# Patient Record
Sex: MALE | Race: OTHER | HISPANIC OR LATINO | Employment: UNEMPLOYED | URBAN - METROPOLITAN AREA
[De-identification: names, ages, dates, MRNs, and addresses within clinical notes are randomized per-mention and may not be internally consistent; named-entity substitution may affect disease eponyms.]

---

## 2019-10-28 ENCOUNTER — TRANSCRIBE ORDERS (OUTPATIENT)
Dept: RADIOLOGY | Facility: HOSPITAL | Age: 59
End: 2019-10-28

## 2019-10-28 ENCOUNTER — APPOINTMENT (OUTPATIENT)
Dept: LAB | Facility: CLINIC | Age: 59
End: 2019-10-28
Payer: COMMERCIAL

## 2019-10-28 ENCOUNTER — OFFICE VISIT (OUTPATIENT)
Dept: FAMILY MEDICINE CLINIC | Facility: CLINIC | Age: 59
End: 2019-10-28
Payer: COMMERCIAL

## 2019-10-28 ENCOUNTER — HOSPITAL ENCOUNTER (OUTPATIENT)
Dept: RADIOLOGY | Facility: HOSPITAL | Age: 59
Discharge: HOME | End: 2019-10-28
Payer: COMMERCIAL

## 2019-10-28 VITALS
DIASTOLIC BLOOD PRESSURE: 66 MMHG | HEIGHT: 63 IN | RESPIRATION RATE: 20 BRPM | SYSTOLIC BLOOD PRESSURE: 122 MMHG | WEIGHT: 193.2 LBS | BODY MASS INDEX: 34.23 KG/M2 | HEART RATE: 80 BPM

## 2019-10-28 DIAGNOSIS — M54.16 LUMBAR RADICULITIS: ICD-10-CM

## 2019-10-28 DIAGNOSIS — Z12.5 SCREENING FOR PROSTATE CANCER: ICD-10-CM

## 2019-10-28 DIAGNOSIS — R09.89 DECREASED PEDAL PULSES: ICD-10-CM

## 2019-10-28 DIAGNOSIS — Z00.00 HEALTH CARE MAINTENANCE: ICD-10-CM

## 2019-10-28 DIAGNOSIS — Z12.11 SCREENING FOR COLON CANCER: ICD-10-CM

## 2019-10-28 DIAGNOSIS — M79.606 PAIN OF LOWER EXTREMITY, UNSPECIFIED LATERALITY: ICD-10-CM

## 2019-10-28 DIAGNOSIS — Z13.220 SCREENING FOR LIPID DISORDERS: ICD-10-CM

## 2019-10-28 DIAGNOSIS — E66.9 OBESITY (BMI 30-39.9): ICD-10-CM

## 2019-10-28 DIAGNOSIS — M54.16 LUMBAR RADICULITIS: Primary | ICD-10-CM

## 2019-10-28 DIAGNOSIS — M62.830 MUSCLE SPASM OF BACK: ICD-10-CM

## 2019-10-28 LAB
ALBUMIN SERPL BCP-MCNC: 4.2 G/DL (ref 3.5–5)
ALP SERPL-CCNC: 79 U/L (ref 46–116)
ALT SERPL W P-5'-P-CCNC: 30 U/L (ref 12–78)
ANION GAP SERPL CALCULATED.3IONS-SCNC: 7 MMOL/L (ref 4–13)
AST SERPL W P-5'-P-CCNC: 16 U/L (ref 5–45)
BILIRUB SERPL-MCNC: 0.49 MG/DL (ref 0.2–1)
BILIRUB UR QL STRIP: NEGATIVE
BUN SERPL-MCNC: 18 MG/DL (ref 5–25)
CALCIUM SERPL-MCNC: 9 MG/DL (ref 8.3–10.1)
CHLORIDE SERPL-SCNC: 109 MMOL/L (ref 100–108)
CHOLEST SERPL-MCNC: 178 MG/DL (ref 50–200)
CLARITY UR: CLEAR
CO2 SERPL-SCNC: 26 MMOL/L (ref 21–32)
COLOR UR: YELLOW
CREAT SERPL-MCNC: 0.91 MG/DL (ref 0.6–1.3)
ERYTHROCYTE [DISTWIDTH] IN BLOOD BY AUTOMATED COUNT: 12.4 % (ref 11.6–15.1)
GFR SERPL CREATININE-BSD FRML MDRD: 91 ML/MIN/1.73SQ M
GLUCOSE P FAST SERPL-MCNC: 100 MG/DL (ref 65–99)
GLUCOSE UR STRIP-MCNC: NEGATIVE MG/DL
HCT VFR BLD AUTO: 43.1 % (ref 36.5–49.3)
HDLC SERPL-MCNC: 43 MG/DL
HGB BLD-MCNC: 14 G/DL (ref 12–17)
HGB UR QL STRIP.AUTO: NEGATIVE
KETONES UR STRIP-MCNC: NEGATIVE MG/DL
LDLC SERPL CALC-MCNC: 116 MG/DL (ref 0–100)
LEUKOCYTE ESTERASE UR QL STRIP: NEGATIVE
MCH RBC QN AUTO: 30.4 PG (ref 26.8–34.3)
MCHC RBC AUTO-ENTMCNC: 32.5 G/DL (ref 31.4–37.4)
MCV RBC AUTO: 94 FL (ref 82–98)
NITRITE UR QL STRIP: NEGATIVE
PH UR STRIP.AUTO: 5.5 [PH]
PLATELET # BLD AUTO: 247 THOUSANDS/UL (ref 149–390)
PMV BLD AUTO: 9.2 FL (ref 8.9–12.7)
POTASSIUM SERPL-SCNC: 4.1 MMOL/L (ref 3.5–5.3)
PROT SERPL-MCNC: 7.6 G/DL (ref 6.4–8.2)
PROT UR STRIP-MCNC: NEGATIVE MG/DL
PSA SERPL-MCNC: 0.5 NG/ML (ref 0–4)
RBC # BLD AUTO: 4.6 MILLION/UL (ref 3.88–5.62)
SODIUM SERPL-SCNC: 142 MMOL/L (ref 136–145)
SP GR UR STRIP.AUTO: 1.02 (ref 1–1.03)
TRIGL SERPL-MCNC: 93 MG/DL
UROBILINOGEN UR QL STRIP.AUTO: 0.2 E.U./DL
WBC # BLD AUTO: 5.65 THOUSAND/UL (ref 4.31–10.16)

## 2019-10-28 PROCEDURE — 36415 COLL VENOUS BLD VENIPUNCTURE: CPT | Performed by: FAMILY MEDICINE

## 2019-10-28 PROCEDURE — 85027 COMPLETE CBC AUTOMATED: CPT | Performed by: FAMILY MEDICINE

## 2019-10-28 PROCEDURE — 80061 LIPID PANEL: CPT | Performed by: FAMILY MEDICINE

## 2019-10-28 PROCEDURE — G0103 PSA SCREENING: HCPCS

## 2019-10-28 PROCEDURE — 81003 URINALYSIS AUTO W/O SCOPE: CPT | Performed by: FAMILY MEDICINE

## 2019-10-28 PROCEDURE — 80053 COMPREHEN METABOLIC PANEL: CPT | Performed by: FAMILY MEDICINE

## 2019-10-28 PROCEDURE — 99204 OFFICE O/P NEW MOD 45 MIN: CPT | Performed by: FAMILY MEDICINE

## 2019-10-28 PROCEDURE — 3008F BODY MASS INDEX DOCD: CPT | Performed by: FAMILY MEDICINE

## 2019-10-28 PROCEDURE — 72110 X-RAY EXAM L-2 SPINE 4/>VWS: CPT

## 2019-10-28 RX ORDER — CYCLOBENZAPRINE HCL 5 MG
TABLET ORAL
Qty: 30 TABLET | Refills: 0 | Status: SHIPPED | OUTPATIENT
Start: 2019-10-28 | End: 2020-01-06

## 2019-10-28 RX ORDER — MELOXICAM 15 MG/1
TABLET ORAL
Qty: 30 TABLET | Refills: 1 | Status: SHIPPED | OUTPATIENT
Start: 2019-10-28 | End: 2019-12-13 | Stop reason: SDUPTHER

## 2019-10-28 NOTE — PROGRESS NOTES
Assessment and Plan:  1  Lumbar radiculitis 2  Mild spasm back 3  Leg pain  X-ray of lumbar spine is ordered  Physical therapy is ordered  Meloxicam was ordered GI side effects discussed  Flexeril was ordered drowsiness discussed  4  Decreased pedal pulses, check arterial Dopplers  5  BMI 34 22 discussed  6  Screening prostate cancer, PSA ordered  7  Screening colon cancer, refer for colonoscopy  8  Health care maintenance, routine blood work is ordered  Patient refuses tetanus and flu vaccines today  9  Return in 3 weeks, sooner if need        Problem List Items Addressed This Visit        Nervous and Auditory    Lumbar radiculitis - Primary      Lumbar spine x-rays ordered meloxicam was ordered physical therapy is ordered         Relevant Medications    meloxicam (MOBIC) 15 mg tablet    Other Relevant Orders    CBC    Comprehensive metabolic panel    Lipid Panel with Direct LDL reflex    PSA, Total Screen    Urinalysis with reflex to microscopic    XR spine lumbar minimum 4 views non injury    Ambulatory referral to Physical Therapy       Other    Health care maintenance      Refuses Adacel influenza vaccine         Relevant Orders    CBC    Comprehensive metabolic panel    Lipid Panel with Direct LDL reflex    PSA, Total Screen    Urinalysis with reflex to microscopic    Screening for prostate cancer      PSA ordered         Relevant Orders    CBC    Comprehensive metabolic panel    Lipid Panel with Direct LDL reflex    PSA, Total Screen    Urinalysis with reflex to microscopic    Screening for colon cancer      Refer for colonoscopy         Relevant Orders    CBC    Comprehensive metabolic panel    Lipid Panel with Direct LDL reflex    PSA, Total Screen    Urinalysis with reflex to microscopic    Ambulatory referral to colonoscopy screening    Obesity (BMI 30-39  9)      BMI 34 22 discussed         Relevant Orders    CBC    Comprehensive metabolic panel    Lipid Panel with Direct LDL reflex    PSA, Total Screen    Urinalysis with reflex to microscopic    Leg pain      As above         Relevant Medications    meloxicam (MOBIC) 15 mg tablet    Other Relevant Orders    CBC    Comprehensive metabolic panel    Lipid Panel with Direct LDL reflex    PSA, Total Screen    Urinalysis with reflex to microscopic    VAS lower limb arterial duplex, complete bilateral    XR spine lumbar minimum 4 views non injury    Ambulatory referral to Physical Therapy    Muscle spasm of back      As above, Flexeril ordered drowsiness discussed         Relevant Medications    cyclobenzaprine (FLEXERIL) 5 mg tablet    Other Relevant Orders    CBC    Comprehensive metabolic panel    Lipid Panel with Direct LDL reflex    PSA, Total Screen    Urinalysis with reflex to microscopic    XR spine lumbar minimum 4 views non injury    Ambulatory referral to Physical Therapy      Other Visit Diagnoses     Decreased pedal pulses        Relevant Orders    CBC    Comprehensive metabolic panel    Lipid Panel with Direct LDL reflex    PSA, Total Screen    Urinalysis with reflex to microscopic    VAS lower limb arterial duplex, complete bilateral    Screening for lipid disorders        Relevant Orders    CBC    Comprehensive metabolic panel    Lipid Panel with Direct LDL reflex    PSA, Total Screen    Urinalysis with reflex to microscopic                 Diagnoses and all orders for this visit:    Lumbar radiculitis  -     CBC  -     Comprehensive metabolic panel  -     Lipid Panel with Direct LDL reflex  -     PSA, Total Screen; Future  -     Urinalysis with reflex to microscopic  -     XR spine lumbar minimum 4 views non injury; Future  -     Ambulatory referral to Physical Therapy; Future  -     meloxicam (MOBIC) 15 mg tablet; Take 1 daily with food    Muscle spasm of back  -     CBC  -     Comprehensive metabolic panel  -     Lipid Panel with Direct LDL reflex  -     PSA, Total Screen;  Future  -     Urinalysis with reflex to microscopic  -     XR spine lumbar minimum 4 views non injury; Future  -     Ambulatory referral to Physical Therapy; Future  -     cyclobenzaprine (FLEXERIL) 5 mg tablet; Take 1 tablet  at bedtime for muscle spasm    Pain of lower extremity, unspecified laterality  -     CBC  -     Comprehensive metabolic panel  -     Lipid Panel with Direct LDL reflex  -     PSA, Total Screen; Future  -     Urinalysis with reflex to microscopic  -     VAS lower limb arterial duplex, complete bilateral; Future  -     XR spine lumbar minimum 4 views non injury; Future  -     Ambulatory referral to Physical Therapy; Future  -     meloxicam (MOBIC) 15 mg tablet; Take 1 daily with food    Obesity (BMI 30-39 9)  -     CBC  -     Comprehensive metabolic panel  -     Lipid Panel with Direct LDL reflex  -     PSA, Total Screen; Future  -     Urinalysis with reflex to microscopic    Screening for colon cancer  -     CBC  -     Comprehensive metabolic panel  -     Lipid Panel with Direct LDL reflex  -     PSA, Total Screen; Future  -     Urinalysis with reflex to microscopic  -     Ambulatory referral to colonoscopy screening; Future    Screening for prostate cancer  -     CBC  -     Comprehensive metabolic panel  -     Lipid Panel with Direct LDL reflex  -     PSA, Total Screen; Future  -     Urinalysis with reflex to microscopic    Health care maintenance  -     CBC  -     Comprehensive metabolic panel  -     Lipid Panel with Direct LDL reflex  -     PSA, Total Screen; Future  -     Urinalysis with reflex to microscopic    Decreased pedal pulses  -     CBC  -     Comprehensive metabolic panel  -     Lipid Panel with Direct LDL reflex  -     PSA, Total Screen; Future  -     Urinalysis with reflex to microscopic  -     VAS lower limb arterial duplex, complete bilateral; Future    Screening for lipid disorders  -     CBC  -     Comprehensive metabolic panel  -     Lipid Panel with Direct LDL reflex  -     PSA, Total Screen;  Future  -     Urinalysis with reflex to microscopic              Subjective:      Patient ID: Kavya Covarrubias is a 59 y o  male  CC:    Chief Complaint   Patient presents with   [facility]      pt here as a new pt, pt states he hasnt seen a family doctor 4 year  R [facility]    Leg Pain     pt complains of leg and back pain x 2-3 years  HPI:     Patient is here to establish herself as a patient this office  He has not seen a family doctor last 4-5 years  Has not had any routine blood work colonoscopy or cancer screenings  Patient declines flu vaccine or tetanus vaccine today  Patient for the past few weeks to months has low back pain with radiation to bilateral legs  Seems worst in bed but however it does get worse with ambulation  Negative weakness negative paresthesias lower extremities  The following portions of the patient's history were reviewed and updated as appropriate: allergies, current medications, past family history, past medical history, past social history, past surgical history and problem list       Review of Systems   Constitutional: Negative  HENT: Negative  Eyes: Negative  Respiratory: Negative  Cardiovascular: Negative  Gastrointestinal:          No colonoscopy completed   Endocrine: Negative  Genitourinary:         No prostate cancer  Screening completed   Musculoskeletal:         HPI   Skin: Negative  Allergic/Immunologic: Negative  Neurological:         HPI   Hematological: Negative  Psychiatric/Behavioral: Negative  Data to review:       Objective:    Vitals:    10/28/19 0849   BP: 122/66   BP Location: Left arm   Patient Position: Sitting   Cuff Size: Large   Pulse: 80   Resp: 20   Weight: 87 6 kg (193 lb 3 2 oz)   Height: 5' 3" (1 6 m)        Physical Exam   Constitutional: He is oriented to person, place, and time  He appears well-developed and well-nourished  HENT:   Head: Normocephalic and atraumatic     Right Ear: External ear normal    Left Ear: External ear normal    Nose: Nose normal    Mouth/Throat: Oropharynx is clear and moist  No oropharyngeal exudate  Eyes: Pupils are equal, round, and reactive to light  Conjunctivae and EOM are normal  No scleral icterus  Neck: Neck supple  No JVD present  No tracheal deviation present  No thyromegaly present  Cardiovascular: Normal rate, regular rhythm and normal heart sounds  Question decreased pulses bilateral lower extremities   Pulmonary/Chest: Effort normal and breath sounds normal    Abdominal: Soft  Bowel sounds are normal  He exhibits no distension and no mass  There is no tenderness  There is no rebound and no guarding  No hernia  Musculoskeletal: He exhibits no edema, tenderness or deformity  Lumbar sacral area with paravertebral muscle contraction positive mild spasm latissimus store C and erector spinae musculature negative point tenderness negative gross deformity  Straight leg raising 90° bilateral   Lymphadenopathy:     He has no cervical adenopathy  Neurological: He is alert and oriented to person, place, and time  He displays normal reflexes  No cranial nerve deficit or sensory deficit  He exhibits normal muscle tone  Coordination normal    Skin: Skin is warm and dry  Psychiatric: He has a normal mood and affect  BMI Counseling: Body mass index is 34 22 kg/m²  The BMI is above normal  Nutrition recommendations include decreasing overall calorie intake  Exercise recommendations include exercising 3-5 times per week

## 2019-10-28 NOTE — PATIENT INSTRUCTIONS
Complete blood work as ordered  Complete lower extremity toe Dopplers as ordered  Physical therapy follow up to be completed  Patient complete colonoscopy as ordered  Return in 3 weeks    Sooner if needed

## 2019-11-04 ENCOUNTER — HOSPITAL ENCOUNTER (OUTPATIENT)
Dept: NON INVASIVE DIAGNOSTICS | Facility: HOSPITAL | Age: 59
Discharge: HOME | End: 2019-11-04
Payer: COMMERCIAL

## 2019-11-04 ENCOUNTER — TELEPHONE (OUTPATIENT)
Dept: FAMILY MEDICINE CLINIC | Facility: CLINIC | Age: 59
End: 2019-11-04

## 2019-11-04 DIAGNOSIS — R09.89 DECREASED PEDAL PULSES: ICD-10-CM

## 2019-11-04 DIAGNOSIS — M79.606 PAIN OF LOWER EXTREMITY, UNSPECIFIED LATERALITY: ICD-10-CM

## 2019-11-04 PROCEDURE — 93923 UPR/LXTR ART STDY 3+ LVLS: CPT

## 2019-11-04 PROCEDURE — 93922 UPR/L XTREMITY ART 2 LEVELS: CPT | Performed by: SURGERY

## 2019-11-04 PROCEDURE — 93925 LOWER EXTREMITY STUDY: CPT | Performed by: SURGERY

## 2019-11-04 PROCEDURE — 93925 LOWER EXTREMITY STUDY: CPT

## 2019-11-12 ENCOUNTER — OFFICE VISIT (OUTPATIENT)
Dept: FAMILY MEDICINE CLINIC | Facility: CLINIC | Age: 59
End: 2019-11-12
Payer: COMMERCIAL

## 2019-11-12 VITALS
RESPIRATION RATE: 18 BRPM | DIASTOLIC BLOOD PRESSURE: 70 MMHG | TEMPERATURE: 97.3 F | WEIGHT: 197 LBS | BODY MASS INDEX: 34.91 KG/M2 | HEART RATE: 84 BPM | SYSTOLIC BLOOD PRESSURE: 124 MMHG | HEIGHT: 63 IN

## 2019-11-12 DIAGNOSIS — M62.830 MUSCLE SPASM OF BACK: ICD-10-CM

## 2019-11-12 DIAGNOSIS — M54.16 LUMBAR RADICULITIS: Primary | ICD-10-CM

## 2019-11-12 DIAGNOSIS — M79.606 PAIN OF LOWER EXTREMITY, UNSPECIFIED LATERALITY: ICD-10-CM

## 2019-11-12 DIAGNOSIS — M47.816 OSTEOARTHRITIS OF LUMBAR SPINE, UNSPECIFIED SPINAL OSTEOARTHRITIS COMPLICATION STATUS: ICD-10-CM

## 2019-11-12 DIAGNOSIS — Z12.5 SCREENING FOR PROSTATE CANCER: ICD-10-CM

## 2019-11-12 DIAGNOSIS — E78.5 DYSLIPIDEMIA: ICD-10-CM

## 2019-11-12 PROCEDURE — 1036F TOBACCO NON-USER: CPT | Performed by: FAMILY MEDICINE

## 2019-11-12 PROCEDURE — 99214 OFFICE O/P EST MOD 30 MIN: CPT | Performed by: FAMILY MEDICINE

## 2019-11-12 NOTE — PROGRESS NOTES
Assessment and Plan:  1  Lumbar radiculitis 2  DJD of lumbar spine 3  Leg pain 4  Muscle spasm of back  X-ray show some mild arthritis especially L4-5  Arterial Doppler was normal   Patient is going to physical therapy at [facility]   Improving symptoms  Continue present therapy  Will recheck in 6 weeks  If not much improved would consider MRI and [facility] at that point        5  Prostate cancer screening, PSA is normal  6  Dyslipidemia lipid panel shows LDL above 100 but below 130    Problem List Items Addressed This Visit        Nervous and Auditory    Lumbar radiculitis - Primary      Improving continue therapy, continue medications            Musculoskeletal and Integument    Osteoarthritis of lumbar spine      As above            Other    Screening for prostate cancer      PSA is normal         Leg pain      As above, patient's arterial Doppler was normal negative peripheral arterial disease         Muscle spasm of back      Improving, as above         Dyslipidemia      LDL is greater than 100 per below 130                      Diagnoses and all orders for this visit:    Lumbar radiculitis    Osteoarthritis of lumbar spine, unspecified spinal osteoarthritis complication status    Pain of lower extremity, unspecified laterality    Muscle spasm of back    Screening for prostate cancer    Dyslipidemia              Subjective:      Patient ID: Adair Guerra is a 62 y o  male  CC:    Chief Complaint   Patient presents with    Follow-up     Patient present today for a follow up on his Blood work and test results  HPI:     Patient is doing better  Back pain is almost eliminated still with leg pain but much improved  Is going to physical therapy at [facility]   Has gone 4-5 times  With improvement noted  Blood work, a vascular study, x-rays were discussed   With the patient and his wife    Patient speaks very little in was wife is translating  The following portions of the patient's history were reviewed and updated as appropriate: allergies, current medications, past family history, past medical history, past social history, past surgical history and problem list       Review of Systems   Constitutional: Negative  HENT: Negative  Eyes: Negative  Respiratory: Negative  Cardiovascular: Negative  Gastrointestinal: Negative  Endocrine: Negative  Genitourinary: Negative  Musculoskeletal:          HPI   Skin: Negative  Allergic/Immunologic: Negative  Neurological:         HPI, no paresthesias or  weakness   Hematological: Negative  Psychiatric/Behavioral: Negative  Data to review:       Objective:    Vitals:    11/12/19 0838   BP: 124/70   BP Location: Left arm   Patient Position: Sitting   Cuff Size: Large   Pulse: 84   Resp: 18   Temp: (!) 97 3 °F (36 3 °C)   TempSrc: Temporal   Weight: 89 4 kg (197 lb)   Height: 5' 3" (1 6 m)        Physical Exam   Constitutional: He is oriented to person, place, and time  He appears well-developed and well-nourished  HENT:   Head: Normocephalic and atraumatic  Mouth/Throat: Oropharynx is clear and moist    Eyes: Pupils are equal, round, and reactive to light  Conjunctivae and EOM are normal  No scleral icterus  Neck: Neck supple  No JVD present  Cardiovascular: Normal rate, regular rhythm, normal heart sounds and intact distal pulses  Pulmonary/Chest: Effort normal and breath sounds normal    Abdominal: Soft  Bowel sounds are normal  There is no tenderness  Musculoskeletal: He exhibits no edema, tenderness or deformity  Mild paravertebral muscle contraction lumbar sacral spine mild restriction in motion with extension  Straight leg raising 90° bilateral   Neurological: He is alert and oriented to person, place, and time  He displays normal reflexes  No cranial nerve deficit or sensory deficit  He exhibits normal muscle tone   Coordination normal  Skin: Skin is warm and dry  Psychiatric: He has a normal mood and affect

## 2019-12-11 ENCOUNTER — TELEPHONE (OUTPATIENT)
Dept: FAMILY MEDICINE CLINIC | Facility: CLINIC | Age: 59
End: 2019-12-11

## 2019-12-12 ENCOUNTER — CONSULT (OUTPATIENT)
Dept: GASTROENTEROLOGY | Facility: MEDICAL CENTER | Age: 59
End: 2019-12-12
Payer: COMMERCIAL

## 2019-12-12 VITALS
SYSTOLIC BLOOD PRESSURE: 129 MMHG | WEIGHT: 198.6 LBS | BODY MASS INDEX: 35.18 KG/M2 | TEMPERATURE: 97.9 F | DIASTOLIC BLOOD PRESSURE: 72 MMHG | HEART RATE: 84 BPM

## 2019-12-12 DIAGNOSIS — K21.9 GASTROESOPHAGEAL REFLUX DISEASE, ESOPHAGITIS PRESENCE NOT SPECIFIED: ICD-10-CM

## 2019-12-12 DIAGNOSIS — Z12.11 SCREENING FOR COLON CANCER: Primary | ICD-10-CM

## 2019-12-12 PROCEDURE — 99243 OFF/OP CNSLTJ NEW/EST LOW 30: CPT | Performed by: INTERNAL MEDICINE

## 2019-12-12 NOTE — PROGRESS NOTES
[facility] - Outpatient Consultation  Sofie Sandy 62 y o  male MRN: [id number]  Encounter: [id number]          ASSESSMENT AND PLAN:      1  Screening for colon cancer  Patient reports his last colonoscopy was 7 years ago  He states it was normal at that time (report not available for review today), and he was told to repeat the exam in 5-7 years  He has no family history of colorectal cancer  He is currently without GI complaints at this time  I discussed the indication, risks and benefits colonoscopy for colorectal cancer screening with him today  Informed consent was obtained for the procedure  Risks of infection, perforation and hemorrhage were discussed  The patient was agreeable to proceed with the procedure  - MiraLax/Dulcolax preparation instructions provided  - Colonoscopy; Future    2  Gastroesophageal reflux disease, esophagitis presence not specified  Patient reports intermittent, chronic symptoms of acid reflux  He is currently without red flag symptoms at this time such as dysphagia, odynophagia, melena or weight loss  Given his longstanding symptoms and former smoking status I discussed the indication performing upper endoscopy for screening for Troncoso's esophagus  He is agreeable to proceed  He is not currently using anti-reflux medications at this time is controlling his symptoms with diet modification  If there is evidence of Troncoso's esophagus on his upper endoscopy he would need to start daily PPI therapy  - EGD; Future      ______________________________________________________________________    HPI:  Adair Guerra is a 62 y o  male with a history of obesity, dyslipidemia who presents to discuss screening colonoscopy and acid reflux  Patient reports longstanding, intermittent symptoms of acid reflux  He will have a burning sensation substernally, which is nonradiating and mild in severity    It is usually related to certain kinds of foods such as spicy foods or red sauces  He is not currently on anti-reflux therapy and has use this very rarely in the past   If he avoids the trigger foods he does not have symptoms  He denies dysphagia or odynophagia  His appetite has been good his weight has been increasing  He denies nausea or vomiting  His bowel movements occur every day and are of normal color and caliber  He has no family history of gastrointestinal disease including colorectal cancer  He states his last colonoscopy was 7 years ago and was reportedly normal (report not available for review) he was returned told to repeat colonoscopy in 5-7 years  He takes no anti-platelet or anticoagulant medications  He has no prior upper endoscopy  REVIEW OF SYSTEMS:    CONSTITUTIONAL: Denies any fever, chills, rigors, and weight loss  HEENT: No earache or tinnitus  Denies hearing loss or visual disturbances  CARDIOVASCULAR: No chest pain or palpitations  RESPIRATORY: Denies any cough, hemoptysis, shortness of breath or dyspnea on exertion  GASTROINTESTINAL: As noted in the History of Present Illness  GENITOURINARY: No problems with urination  Denies any hematuria or dysuria  NEUROLOGIC: No dizziness or vertigo, denies headaches  MUSCULOSKELETAL: Denies any muscle or joint pain  SKIN: Denies skin rashes or itching  ENDOCRINE: Denies excessive thirst  Denies intolerance to heat or cold  PSYCHOSOCIAL: Denies depression or anxiety  Denies any recent memory loss  Historical Information   No past medical history on file  dyslipidemia, obesity   No past surgical history on file    Social History   Social History     Substance and Sexual Activity   Alcohol Use Not Currently     Social History     Substance and Sexual Activity   Drug Use Never     Social History     Tobacco Use   Smoking Status Former Smoker   Smokeless Tobacco Former User     Family History   Problem Relation Age of Onset    Dementia Mother    Ratanvi Leslie Dementia Father        Meds/Allergies       Current Outpatient Medications:     cyclobenzaprine (FLEXERIL) 5 mg tablet    meloxicam (MOBIC) 15 mg tablet    No Known Allergies        Objective     Blood pressure 129/72, pulse 84, temperature 97 9 °F (36 6 °C), temperature source Tympanic, weight 90 1 kg (198 lb 9 6 oz)  Body mass index is 35 18 kg/m²  PHYSICAL EXAM:      General Appearance:   Well-appearing older male the Alert, cooperative, no distress   HEENT:   Normocephalic, atraumatic, anicteric  Neck:  Supple, symmetrical, trachea midline   Lungs:   Clear to auscultation bilaterally; no rales, rhonchi or wheezing; respirations unlabored    Heart[de-identified]   Regular rate and rhythm; no murmur, rub, or gallop  Abdomen:   Soft, non-tender, non-distended; normal bowel sounds; no masses, no organomegaly    Genitalia:   Deferred    Rectal:   Deferred    Extremities:  No cyanosis, clubbing or edema    Pulses:  2+ and symmetric    Skin:  No jaundice, rashes, or lesions    Lymph nodes:  No palpable cervical lymphadenopathy        Lab Results:   No visits with results within 1 Day(s) from this visit  Latest known visit with results is:   Appointment on 10/28/2019   Component Date Value    PSA 10/28/2019 0 5          Radiology Results:   No results found

## 2019-12-12 NOTE — PATIENT INSTRUCTIONS
The patient is scheduled at [facility] for a colonoscopy/Egd with Dr Jesusita Sanchez on 1/17/20  Miralax/Dulcolax prep instructions have been gone over in the office, with the patient, by the MA  The patient is aware that they will receive a call with the arrival time the day prior to procedure and that they will need a  the day of the procedure  I have asked the patient to call with any questions that they might have prior to procedure

## 2019-12-13 ENCOUNTER — OFFICE VISIT (OUTPATIENT)
Dept: FAMILY MEDICINE CLINIC | Facility: CLINIC | Age: 59
End: 2019-12-13
Payer: COMMERCIAL

## 2019-12-13 VITALS
DIASTOLIC BLOOD PRESSURE: 80 MMHG | WEIGHT: 199.4 LBS | BODY MASS INDEX: 35.33 KG/M2 | HEIGHT: 63 IN | HEART RATE: 78 BPM | RESPIRATION RATE: 18 BRPM | SYSTOLIC BLOOD PRESSURE: 122 MMHG | TEMPERATURE: 97 F

## 2019-12-13 DIAGNOSIS — Z12.11 SCREENING FOR COLON CANCER: ICD-10-CM

## 2019-12-13 DIAGNOSIS — M47.816 OSTEOARTHRITIS OF LUMBAR SPINE, UNSPECIFIED SPINAL OSTEOARTHRITIS COMPLICATION STATUS: Primary | ICD-10-CM

## 2019-12-13 DIAGNOSIS — M79.606 PAIN OF LOWER EXTREMITY, UNSPECIFIED LATERALITY: ICD-10-CM

## 2019-12-13 DIAGNOSIS — M54.16 LUMBAR RADICULITIS: ICD-10-CM

## 2019-12-13 DIAGNOSIS — M62.830 MUSCLE SPASM OF BACK: ICD-10-CM

## 2019-12-13 PROCEDURE — 1036F TOBACCO NON-USER: CPT | Performed by: FAMILY MEDICINE

## 2019-12-13 PROCEDURE — 3008F BODY MASS INDEX DOCD: CPT | Performed by: FAMILY MEDICINE

## 2019-12-13 PROCEDURE — 99214 OFFICE O/P EST MOD 30 MIN: CPT | Performed by: FAMILY MEDICINE

## 2019-12-13 RX ORDER — MELOXICAM 15 MG/1
TABLET ORAL
Qty: 30 TABLET | Refills: 1 | Status: SHIPPED | OUTPATIENT
Start: 2019-12-13 | End: 2020-01-06

## 2019-12-13 NOTE — PROGRESS NOTES
Assessment and Plan:  1  Lumbar radiculitis 2  Leg pain 3  DJD lumbar spine 4  Mild spasm of back  Patient's better but not completely resolved  Will order [facility] patient and wife were in agreement  5  Colon cancer screening  Patient has appointment for colonoscopy 1/20  6  Return after above consultation if needed          Problem List Items Addressed This Visit        Nervous and Auditory    Lumbar radiculitis    Relevant Medications    meloxicam (MOBIC) 15 mg tablet    Other Relevant Orders    MRI lumbar spine wo contrast    Ambulatory referral to Pain Management       Musculoskeletal and Integument    Osteoarthritis of lumbar spine - Primary    Relevant Orders    MRI lumbar spine wo contrast    Ambulatory referral to Pain Management       Other    Screening for colon cancer      Has appointment for colonoscopy 1/20         Leg pain    Relevant Medications    meloxicam (MOBIC) 15 mg tablet    Other Relevant Orders    MRI lumbar spine wo contrast    Ambulatory referral to Pain Management    Muscle spasm of back    Relevant Orders    MRI lumbar spine wo contrast    Ambulatory referral to Pain Management                 Diagnoses and all orders for this visit:    Osteoarthritis of lumbar spine, unspecified spinal osteoarthritis complication status  -     MRI lumbar spine wo contrast; Future  -     Ambulatory referral to Pain Management; Future    Lumbar radiculitis  -     meloxicam (MOBIC) 15 mg tablet; Take 1 daily with food  -     MRI lumbar spine wo contrast; Future  -     Ambulatory referral to Pain Management; Future    Pain of lower extremity, unspecified laterality  -     meloxicam (MOBIC) 15 mg tablet; Take 1 daily with food  -     MRI lumbar spine wo contrast; Future  -     Ambulatory referral to Pain Management; Future    Muscle spasm of back  -     MRI lumbar spine wo contrast; Future  -     Ambulatory referral to Pain Management;  Future    Screening for colon cancer Subjective:      Patient ID: Blayne Ramires is a 62 y o  male  CC:    Chief Complaint   Patient presents with    Follow-up     pt is here for a follow up and is still having pain       HPI:     Patient's better but still has pain  At worst patient's pain is 8/10 at the current time today is 3/10  X-ray was again reviewed with the patient his wife  Patient did undergo physical therapy still having pain  Will proceed with MRI of lumbar spine [facility]      The following portions of the patient's history were reviewed and updated as appropriate: allergies, current medications, past family history, past medical history, past social history, past surgical history and problem list       Review of Systems   Constitutional: Negative  HENT: Negative  Eyes: Negative  Respiratory: Negative  Cardiovascular: Negative  Gastrointestinal: Negative  Endocrine: Negative  Genitourinary: Negative  Musculoskeletal:         HPI   Skin: Negative  Allergic/Immunologic: Negative  Neurological:         HPI   Hematological: Negative  Data to review:       Objective:    Vitals:    12/13/19 0904   BP: 122/80   BP Location: Left arm   Patient Position: Sitting   Cuff Size: Adult   Pulse: 78   Resp: 18   Temp: (!) 97 °F (36 1 °C)   TempSrc: Temporal   Weight: 90 4 kg (199 lb 6 4 oz)   Height: 5' 3" (1 6 m)        Physical Exam   Constitutional: He is oriented to person, place, and time  He appears well-developed and well-nourished  HENT:   Head: Normocephalic and atraumatic  Mouth/Throat: Oropharynx is clear and moist    Eyes: No scleral icterus  Neck: Neck supple  No JVD present  Cardiovascular: Normal rate, regular rhythm and normal heart sounds  Pulmonary/Chest: Effort normal and breath sounds normal    Abdominal: Soft  Bowel sounds are normal  There is no tenderness  Musculoskeletal: He exhibits no edema, tenderness or deformity      Positive paravertebral muscle contraction lumbar sacral area  Mild mild spasm erector spinae musculature decrease flexion and extension   Neurological: He is alert and oriented to person, place, and time  No cranial nerve deficit  Skin: Skin is warm and dry  Psychiatric: He has a normal mood and affect

## 2019-12-13 NOTE — PATIENT INSTRUCTIONS
Complete MRI lumbar spine  [facility] with [facility]    Return after consultation if needed

## 2019-12-19 ENCOUNTER — HOSPITAL ENCOUNTER (OUTPATIENT)
Dept: MRI IMAGING | Facility: HOSPITAL | Age: 59
Discharge: HOME | End: 2019-12-19
Payer: COMMERCIAL

## 2019-12-19 DIAGNOSIS — M47.816 OSTEOARTHRITIS OF LUMBAR SPINE, UNSPECIFIED SPINAL OSTEOARTHRITIS COMPLICATION STATUS: ICD-10-CM

## 2019-12-19 DIAGNOSIS — M54.16 LUMBAR RADICULITIS: ICD-10-CM

## 2019-12-19 DIAGNOSIS — M79.606 PAIN OF LOWER EXTREMITY, UNSPECIFIED LATERALITY: ICD-10-CM

## 2019-12-19 DIAGNOSIS — M62.830 MUSCLE SPASM OF BACK: ICD-10-CM

## 2019-12-19 PROBLEM — M48.061 LUMBAR SPINAL STENOSIS: Status: ACTIVE | Noted: 2019-12-19

## 2019-12-19 PROCEDURE — 72148 MRI LUMBAR SPINE W/O DYE: CPT

## 2019-12-30 ENCOUNTER — CONSULT (OUTPATIENT)
Dept: PAIN MEDICINE | Facility: MEDICAL CENTER | Age: 59
End: 2019-12-30
Payer: COMMERCIAL

## 2019-12-30 VITALS
HEART RATE: 69 BPM | DIASTOLIC BLOOD PRESSURE: 85 MMHG | WEIGHT: 200 LBS | SYSTOLIC BLOOD PRESSURE: 134 MMHG | HEIGHT: 63 IN | BODY MASS INDEX: 35.44 KG/M2

## 2019-12-30 DIAGNOSIS — M62.830 MUSCLE SPASM OF BACK: ICD-10-CM

## 2019-12-30 DIAGNOSIS — M51.16 LUMBAR DISC DISEASE WITH RADICULOPATHY: Primary | ICD-10-CM

## 2019-12-30 DIAGNOSIS — M79.606 PAIN OF LOWER EXTREMITY, UNSPECIFIED LATERALITY: ICD-10-CM

## 2019-12-30 DIAGNOSIS — M47.816 OSTEOARTHRITIS OF LUMBAR SPINE, UNSPECIFIED SPINAL OSTEOARTHRITIS COMPLICATION STATUS: ICD-10-CM

## 2019-12-30 DIAGNOSIS — M54.16 LUMBAR RADICULITIS: ICD-10-CM

## 2019-12-30 PROCEDURE — 99244 OFF/OP CNSLTJ NEW/EST MOD 40: CPT | Performed by: PHYSICAL MEDICINE & REHABILITATION

## 2019-12-30 NOTE — PATIENT INSTRUCTIONS
Lumbar Radiculopathy   WHAT YOU NEED TO KNOW:   Lumbar radiculopathy is a painful condition that happens when a nerve in your lumbar spine (lower back) is pinched or irritated  Nerves control feeling and movement in your body  You may have numbness or pain that shoots down from your lower back towards your foot  DISCHARGE INSTRUCTIONS:   Medicines:   · Medicines:     ¨ NSAIDs , such as ibuprofen, help decrease swelling, pain, and fever  This medicine is available with or without a doctor's order  NSAIDs can cause stomach bleeding or kidney problems in certain people  If you take blood thinner medicine, always ask your healthcare provider if NSAIDs are safe for you  Always read the medicine label and follow directions  ¨ Muscle relaxers  help decrease pain and muscle spasms  ¨ Opioids: This is a strong medicine given to reduce severe pain  It is also called narcotic pain medicine  Take this medicine exactly as directed by your healthcare provider  ¨ Oral steroids: Steroids may also be given to reduce pain and swelling  ¨ Take your medicine as directed  Contact your healthcare provider if you think your medicine is not helping or if you have side effects  Tell him of her if you are allergic to any medicine  Keep a list of the medicines, vitamins, and herbs you take  Include the amounts, and when and why you take them  Bring the list or the pill bottles to follow-up visits  Carry your medicine list with you in case of an emergency  Follow up with your healthcare provider or spine specialist within 1 to 3 weeks:  After your first follow-up appointment, return to your healthcare provider or spine specialist every 2 weeks until you have healed  Ask for information about physical therapy for your condition  Write down your questions so you remember to ask them during your visits  Physical therapy:  You may need physical therapy to improve your condition   Your physical therapist may teach you certain exercises to improve posture (the way you stand and sit), flexibility, and strength in your lower back  Self care:   · Stay active: It is best to be active when you have lumbar radiculopathy  Your physical therapist or healthcare provider may tell you to take walks to ease yourself back into your daily routine  Avoid long periods of bed rest  Bed rest could worsen your symptoms  Do not move in ways that increase your pain  Ask for more information about the best ways to stay active  · Use ice or heat packs:  Use ice or heat packs as directed on the sore area of your body to decrease the pain and swelling  Put ice in a plastic bag covered with a towel on your low back  Cover heated items with a towel to avoid burns  Use ice and heat as directed  · Avoid heavy lifting: Your condition may worsen if you lift heavy things  Avoid lifting if possible  · Maintain a healthy weight:  Excess body weight may strain your back  Talk with your healthcare provider about ways to lose excess weight if you are overweight  Contact your healthcare provider or spine specialist if:   · Your pain does not improve within 1 to 3 weeks after treatment  · Your pain and weakness keep you from your normal activities at work, home, or school  · You lose more than 10 pounds in 6 months without trying  · You become depressed or sad because of the pain  · You have questions or concerns about your condition or care  Return to the emergency department if:   · You have a fever greater than 100 4°F for longer than 2 days  · You have new, severe back or leg pain, or your pain spreads to both legs  · You have any new signs of numbness or weakness, especially in your lower back, legs, arms, or genital area  · You have new trouble controlling your urine and bowel movements  · You do not feel like your bladder empties when you urinate    © 2017 [address] [employer] Information is for End User's use only and may not be sold, redistributed or otherwise used for commercial purposes  All illustrations and images included in CareNotes® are the copyrighted property of [employer]  or Jermaine Calz[employer]  The above information is an  only  It is not intended as medical advice for individual conditions or treatments  Talk to your doctor, nurse or pharmacist before following any medical regimen to see if it is safe and effective for you

## 2019-12-30 NOTE — PROGRESS NOTES
Assessment  1  Lumbar disc disease with radiculopathy    2  Lumbar radiculitis    3  Pain of lower extremity, unspecified laterality    4  Osteoarthritis of lumbar spine, unspecified spinal osteoarthritis complication status    5  Muscle spasm of back        Plan  Mr Babak Sanders is a pleasant 55-year-old male presents for initial evaluation regarding low back pain radiating into the bilateral lower extremities has been present for several years  He has tried conservative measures including physical therapy for several months as well as meloxicam and muscle relaxers again with minimal relief in his pain  He is demonstrating clinical and diagnostic evidence of lumbar radiculopathy  Lumbar MRI most notable for left neural foraminal disc protrusion and mild right neural foraminal narrowing at L4-L5 as well as diffuse disc bulge at L5-S1  At this time I believe interventional approaches are warranted to better manage his pain  As such we will  1  Schedule for bilateral L4-L5 TFESI under fluoro guidance  2  Advised to continue meloxicam and Flexeril as prescribed  3  Advised to continue physical therapy and home exercise regimen  4  Complete risks and benefits including bleeding, infection, tissue reaction, nerve injury and allergic reaction were discussed  The approach was demonstrated using models and literature was provided  Verbal and written consent was obtained  My impressions and treatment recommendations were discussed in detail with the patient who verbalized understanding and had no further questions  Discharge instructions were provided  I personally saw and examined the patient and I agree with the above discussed plan of care      Orders Placed This Encounter   Procedures    FL spine and pain procedure     Standing Status:   Future     Standing Expiration Date:   12/30/2023     Order Specific Question:   Reason for Exam:     Answer:   Bilateral L4-L5 TFESI     Order Specific Question:   Anticoagulant hold needed? Answer:   No     No orders of the defined types were placed in this encounter  History of Present Illness    Saida Cordoba is a 62 y o  male presents to [place] [address] and Pain associates with complaints of a few years duration of low back pain radiating into the bilateral lower extremities  He denies any significant inciting event or recent trauma  His pain scale is moderate rated 3/10 and interfering with his daily activities  Pain is worse in the morning that he describes as throbbing  He denies weakness in the lower extremities  Pain is worsened with standing, bending, walking  He has had moderate relief with physical therapy and presents today for evaluation of worsening radiating low back pain  I have personally reviewed and/or updated the patient's past medical history, past surgical history, family history, social history, current medications, allergies, and vital signs today  Review of Systems   Constitutional: Negative  HENT: Negative  Eyes: Negative  Respiratory: Negative  Cardiovascular: Negative  Gastrointestinal: Negative  Endocrine: Negative  Genitourinary: Negative  Musculoskeletal: Positive for myalgias  Skin: Negative  Allergic/Immunologic: Negative  Neurological: Negative  Hematological: Negative  Psychiatric/Behavioral: Negative  Patient Active Problem List   Diagnosis    Health care maintenance    Screening for prostate cancer    Screening for colon cancer    Obesity (BMI 30-39  9)    Lumbar radiculitis    Leg pain    Muscle spasm of back    Osteoarthritis of lumbar spine    Dyslipidemia    Lumbar spinal stenosis       History reviewed  No pertinent past medical history  History reviewed  No pertinent surgical history      Family History   Problem Relation Age of Onset    Dementia Mother     Dementia Father        Social History     Occupational History    Not on file   Tobacco Use    Smoking status: Former Smoker    Smokeless tobacco: Former User   Substance and Sexual Activity    Alcohol use: Not Currently    Drug use: Never    Sexual activity: Yes       Current Outpatient Medications on File Prior to Visit   Medication Sig    cyclobenzaprine (FLEXERIL) 5 mg tablet Take 1 tablet  at bedtime for muscle spasm    meloxicam (MOBIC) 15 mg tablet Take 1 daily with food     No current facility-administered medications on file prior to visit  No Known Allergies    Physical Exam    /85   Pulse 69   Ht 5' 3" (1 6 m)   Wt 90 7 kg (200 lb)   BMI 35 43 kg/m²   General: Well-developed, well-nourished individual in no acute distress  Mental: Appropriate mood and affect  Grossly oriented with coherent speech and thought processing  Neuro:  Cranial nerves: Cranial nerve function is grossly intact bilaterally  Strength: Bilateral lower extremity strength is normal and symmetric  No atrophy or tone abnormalities noted  Reflexes: Bilateral lower extremity muscle stretch reflexes are physiologic and symmetric  No ankle clonus is noted  Sensation: No loss of sensation is noted  SLR/Foraminal Compression Maneuvers: Straight leg raising in the   supine position is  positive for radicular pain left lower extremity  Gait:  Gait/gross motor: Gait is normal  Station is forward flexed posture  Musculoskeletal:  Palpation: Inspection and palpation of the spine and extremities are remarkable for tenderness to palpation bilateral lumbar paraspinals  Spine:  Decreased active and passive range of motion limited with lumbar extension secondary to pain  No gross axial skeletal deformities  Skin: Skin inspection grossly negative for erythema, breakdown, or concerning lesions in affected area  Lymph: No lymphadenopathy is appreciated in the involved extremity  Vessels: No lower extremity edema  Lungs: Breathing is comfortable and regular  No dyspnea noted during examination      Eyes: Visual field grossly intact to confrontation  No redness appreciated  ENT: No craniofacial deformities or asymmetry  No neck masses appreciated  Imaging    MRI LUMBAR SPINE WITHOUT CONTRAST     INDICATION: M54 16: Radiculopathy, lumbar region  M79 606: Pain in leg, unspecified  M47 816: Spondylosis without myelopathy or radiculopathy, lumbar region  M62 830: Muscle spasm of back  Low back pain radiating to bilateral legs for over 5 years but continues to get worse/left side worse     COMPARISON:  10/28/2019     TECHNIQUE:  Sagittal T1, sagittal T2, sagittal inversion recovery, axial T1 and axial T2, coronal T2     IMAGE QUALITY:  Diagnostic     FINDINGS:     VERTEBRAL BODIES:  There are 5 lumbar type vertebral bodies  Trace grade 1 anterolisthesis of L4 on L5  Very mild scattered degenerative endplate changes  No focally suspicious marrow lesions  No bone marrow edema or compression abnormality      SACRUM:  Normal signal within the sacrum  No evidence of insufficiency or stress fracture      DISTAL CORD AND CONUS:  Normal size and signal within the distal cord and conus  The conus medullaris terminates at the L1-L2 intervertebral disc space      PARASPINAL SOFT TISSUES:  Paraspinal soft tissues are unremarkable      LOWER THORACIC DISC SPACES:  Normal disc height and signal   No disc herniation, canal stenosis or foraminal narrowing      LUMBAR DISC SPACES:     L1-L2:  Normal      L2-L3:  Mild loss of disc height and signal   Small left neural foraminal disc herniation, protrusion type  Mild left neural foraminal narrowing  Minimal central canal narrowing  Mild right neural foraminal narrowing      L3-L4:  There is a diffuse disk bulge  No significant central canal or neural foraminal narrowing      L4-L5:  There is bilateral facet hypertrophy  There is uncovering the intervertebral disc space  There is a left neural foraminal disc protrusion    There is moderate central canal and left neural foraminal narrowing  Mild right neural foraminal   narrowing      L5-S1:  There is a diffuse disk bulge  No significant central canal or neural foraminal narrowing    Bilateral facet hypertrophy noted      IMPRESSION:     Scattered spondylotic changes most pronounced at L4-5 where there is a trace grade 1 anterolisthesis and moderate left neural foraminal narrowing         Workstation performed: [id number]    LUMBAR SPINE     INDICATION:   M54 16: Radiculopathy, lumbar region  M62 830: Muscle spasm of back  M79 606: Pain in leg, unspecified      COMPARISON:  None     VIEWS:  XR SPINE LUMBAR MINIMUM 4 VIEWS NON INJURY        FINDINGS:     There is no evidence of acute fracture or destructive osseous lesion      Grade 1 anterolisthesis L4 on L5 likely secondary to facet arthropathy     No significant lumbar degenerative change noted      The pedicles appear intact      Soft tissues are unremarkable      IMPRESSION:     Grade 1 anterolisthesis L4 on L5 likely secondary to facet arthropathy        Workstation performed: [id number]

## 2020-01-06 DIAGNOSIS — M54.16 LUMBAR RADICULITIS: ICD-10-CM

## 2020-01-06 DIAGNOSIS — M79.606 PAIN OF LOWER EXTREMITY, UNSPECIFIED LATERALITY: ICD-10-CM

## 2020-01-06 DIAGNOSIS — M62.830 MUSCLE SPASM OF BACK: ICD-10-CM

## 2020-01-06 RX ORDER — MELOXICAM 15 MG/1
TABLET ORAL
Qty: 30 TABLET | Refills: 0 | Status: SHIPPED | OUTPATIENT
Start: 2020-01-06 | End: 2020-04-27 | Stop reason: ALTCHOICE

## 2020-01-06 RX ORDER — CYCLOBENZAPRINE HCL 5 MG
TABLET ORAL
Qty: 30 TABLET | Refills: 0 | Status: SHIPPED | OUTPATIENT
Start: 2020-01-06 | End: 2020-01-17

## 2020-01-15 ENCOUNTER — TELEPHONE (OUTPATIENT)
Dept: GASTROENTEROLOGY | Facility: MEDICAL CENTER | Age: 60
End: 2020-01-15

## 2020-01-15 NOTE — TELEPHONE ENCOUNTER
Pt came into office to reschedule procedure with dr Cholo Beck, I moved pt to 3/9/20 and gave pt new instructions for miralax

## 2020-01-16 ENCOUNTER — HOSPITAL ENCOUNTER (OUTPATIENT)
Dept: RADIOLOGY | Facility: MEDICAL CENTER | Age: 60
Discharge: HOME | End: 2020-01-16
Attending: PHYSICAL MEDICINE & REHABILITATION | Admitting: PHYSICAL MEDICINE & REHABILITATION
Payer: COMMERCIAL

## 2020-01-16 VITALS
HEART RATE: 91 BPM | DIASTOLIC BLOOD PRESSURE: 74 MMHG | RESPIRATION RATE: 20 BRPM | TEMPERATURE: 98.5 F | OXYGEN SATURATION: 96 % | SYSTOLIC BLOOD PRESSURE: 124 MMHG

## 2020-01-16 DIAGNOSIS — M54.16 LUMBAR RADICULITIS: ICD-10-CM

## 2020-01-16 DIAGNOSIS — M51.16 LUMBAR DISC DISEASE WITH RADICULOPATHY: ICD-10-CM

## 2020-01-16 PROCEDURE — 64483 NJX AA&/STRD TFRM EPI L/S 1: CPT | Performed by: PHYSICAL MEDICINE & REHABILITATION

## 2020-01-16 RX ORDER — BUPIVACAINE HCL/PF 2.5 MG/ML
10 VIAL (ML) INJECTION ONCE
Status: COMPLETED | OUTPATIENT
Start: 2020-01-16 | End: 2020-01-16

## 2020-01-16 RX ORDER — LIDOCAINE HYDROCHLORIDE 10 MG/ML
5 INJECTION, SOLUTION EPIDURAL; INFILTRATION; INTRACAUDAL; PERINEURAL ONCE
Status: COMPLETED | OUTPATIENT
Start: 2020-01-16 | End: 2020-01-16

## 2020-01-16 RX ORDER — PAPAVERINE HCL 150 MG
20 CAPSULE, EXTENDED RELEASE ORAL ONCE
Status: COMPLETED | OUTPATIENT
Start: 2020-01-16 | End: 2020-01-16

## 2020-01-16 RX ADMIN — LIDOCAINE HYDROCHLORIDE 5 ML: 10 INJECTION, SOLUTION EPIDURAL; INFILTRATION; INTRACAUDAL; PERINEURAL at 15:59

## 2020-01-16 RX ADMIN — DEXAMETHASONE SODIUM PHOSPHATE 20 MG: 10 INJECTION, SOLUTION INTRAMUSCULAR; INTRAVENOUS at 16:04

## 2020-01-16 RX ADMIN — Medication 1 ML: at 16:04

## 2020-01-16 RX ADMIN — IOHEXOL 3 ML: 300 INJECTION, SOLUTION INTRAVENOUS at 16:00

## 2020-01-16 NOTE — H&P
History of Present Illness: The patient is a 62 y o  male who presents with complaints of low back pain    Patient Active Problem List   Diagnosis    Health care maintenance    Screening for prostate cancer    Screening for colon cancer    Obesity (BMI 30-39  9)    Lumbar radiculitis    Leg pain    Muscle spasm of back    Osteoarthritis of lumbar spine    Dyslipidemia    Lumbar spinal stenosis       No past medical history on file  No past surgical history on file  Current Outpatient Medications:     cyclobenzaprine (FLEXERIL) 5 mg tablet, TAKE 1 TABLET BY MOUTH AT BEDTIME FOR MUSCLE SPASM, Disp: 30 tablet, Rfl: 0    meloxicam (MOBIC) 15 mg tablet, TAKE 1 TABLET BY MOUTH ONCE DAILY WITH FOOD, Disp: 30 tablet, Rfl: 0    Current Facility-Administered Medications:     bupivacaine (PF) (MARCAINE) 0 25 % injection 10 mL, 10 mL, Epidural, Once, Jaxona Haixu, DO    dexamethasone (PF) (DECADRON) injection 20 mg, 20 mg, Epidural, Once, Molly Ibrahim DO    iohexol (OMNIPAQUE) 300 mg/mL injection 50 mL, 50 mL, Epidural, Once, Jaxona Haixu, DO    lidocaine (PF) (XYLOCAINE-MPF) 1 % injection 5 mL, 5 mL, Infiltration, Once, Jaxona Haixu, DO    No Known Allergies    Physical Exam:   Vitals:    01/16/20 1539   BP: 145/77   Pulse: 97   Resp: 20   Temp: 98 5 °F (36 9 °C)   SpO2: 97%     General: Awake, Alert, Oriented x 3, Mood and affect appropriate  Respiratory: Respirations even and unlabored  Cardiovascular: Peripheral pulses intact; no edema  Musculoskeletal Exam:  Tenderness to palpation bilateral lumbar paraspinals    ASA Score: 2    Patient/Chart Verification  Patient ID Verified: Verbal  ID Band Applied: No  Consents Confirmed: Procedural  H&P( within 30 days) Verified: To be obtained in the Pre-Procedure area  Interval H&P(within 24 hr) Complete (required for Outpatients and Surgery Admit only):  To be obtained in the Pre-Procedure area  Allergies Reviewed: No  Anticoag/NSAID held?: NA  Currently on antibiotics?: No    Assessment:   1  Lumbar radiculitis    2   Lumbar disc disease with radiculopathy        Plan: Bilateral L4-L5 TFESI

## 2020-01-16 NOTE — DISCHARGE INSTRUCTIONS
INSTRUCCIONES PARA EL ROSEMARIE DE KEY INYECCIÓN EPIDURAL DE ESTEROIDES    ACTIVIDAD   No conduzca ni opere maquinarias por hoy   No realice actividades extenuantes por hoy, kyra agacharse, levantar objetos, etc    Puede retomar joshua actividades normales desde mañana  Comience progresivamente y en la medida que lo tolere   Puede ducharse, majo no se bañe en tinas ni en jacuzzis por hoy   Puede sentir entumecimiento valery varias horas debido a la anestesia local  Sea precavido y use el sentido común, en especial con las actividades que implican la carga de [place]  CUIDADO DEL ÁREA DE APLICACIÓN DE LA INYECCIÓN   Si siente molestias o dolor, aplique hielo en el área por hoy (colóquelo valery 20 minutos y retírelo valery otros 20 minutos)   A partir de mañana, podrá aplicar calor húmedo o hielo, si lo necesita   Puede experimentar un aumento o cambio en el malestar valery las 36-48 horas posteriores al tratamiento  Aplique hielo y continúe tomando cualquier analgésico que le hayan recetado   Informe a [facility] si se presenta alguno de estos síntomas: enrojecimiento, secreción, inflamación, dolor de heavenly, rigidez de saleem o fiebre superior a 100 °F  INSTRUCCIONES ESPECIALES   Se pondrán en contacto de nuestro consultorio con usted en aproximadamente 7 días para pedir un informe de progreso  MEDICAMENTOS   Continúe tomando todos joshua medicamentos de [place]   Es posible que en nuestro consultorio le hayan indicado que deje de analisa algunos medicamentos   Puede volver a tomarlos el:              Si tiene algún problema relacionado específicamente con el procedimiento, llame a nuestro consultorio al [phone]  Si tiene problemas que no están relacionados con billy procedimiento, debe comunicarse con billy médico de cabecera

## 2020-01-17 DIAGNOSIS — M62.830 MUSCLE SPASM OF BACK: ICD-10-CM

## 2020-01-17 RX ORDER — CYCLOBENZAPRINE HCL 5 MG
TABLET ORAL
Qty: 30 TABLET | Refills: 0 | Status: SHIPPED | OUTPATIENT
Start: 2020-01-17 | End: 2020-04-27 | Stop reason: ALTCHOICE

## 2020-01-23 ENCOUNTER — TELEPHONE (OUTPATIENT)
Dept: PAIN MEDICINE | Facility: CLINIC | Age: 60
End: 2020-01-23

## 2020-01-24 NOTE — TELEPHONE ENCOUNTER
Patient's wife said that he is doing well  His pain level is a 2/10 he has complete relief on his legs  He had very little discomfort  His wife is asking if he can do any stretching?  He still takes his medication, he is feeling really good

## 2020-01-24 NOTE — TELEPHONE ENCOUNTER
Yes stretching and strengthening would be advised at this point  If he would like a formal physical therapy program I can place the order  Thank you

## 2020-02-10 ENCOUNTER — OFFICE VISIT (OUTPATIENT)
Dept: PAIN MEDICINE | Facility: MEDICAL CENTER | Age: 60
End: 2020-02-10
Payer: COMMERCIAL

## 2020-02-10 VITALS — HEIGHT: 63 IN | BODY MASS INDEX: 35.44 KG/M2 | WEIGHT: 200 LBS

## 2020-02-10 DIAGNOSIS — M51.16 LUMBAR DISC DISEASE WITH RADICULOPATHY: Primary | ICD-10-CM

## 2020-02-10 DIAGNOSIS — M47.26 OSTEOARTHRITIS OF SPINE WITH RADICULOPATHY, LUMBAR REGION: ICD-10-CM

## 2020-02-10 PROCEDURE — 1036F TOBACCO NON-USER: CPT | Performed by: PHYSICAL MEDICINE & REHABILITATION

## 2020-02-10 PROCEDURE — 99214 OFFICE O/P EST MOD 30 MIN: CPT | Performed by: PHYSICAL MEDICINE & REHABILITATION

## 2020-02-10 PROCEDURE — 3008F BODY MASS INDEX DOCD: CPT | Performed by: PHYSICAL MEDICINE & REHABILITATION

## 2020-02-10 NOTE — PROGRESS NOTES
Assessment  1  Lumbar disc disease with radiculopathy    2  Osteoarthritis of spine with radiculopathy, lumbar region        Plan  Mr Kevon Reynoso is a pleasant 68-year-old male with a significant past medical history of lumbar radiculopathy into the bilateral lower extremities presents as follow-up after a bilateral L4-L5 TFESI in which he reports 75 % significant relief in the low back pain  He wished to go back into a formal physical therapy program and is also committed to weight loss and diet management  At this point no further medication was advised and we will stick with conservative measures  As such we will  1  Placed in formal physical therapy program x4 weeks or longer if needed  2  Can always repeat the bilateral L4-L5 transforaminal epidural steroid injections if needed as he has demonstrated significant relief  3  Advised Tylenol no more than 3000 mg per day     My impressions and treatment recommendations were discussed in detail with the patient who verbalized understanding and had no further questions  Discharge instructions were provided  I personally saw and examined the patient and I agree with the above discussed plan of care  Orders Placed This Encounter   Procedures    Ambulatory referral to Physical Therapy     Standing Status:   Future     Standing Expiration Date:   2/10/2021     Referral Priority:   Routine     Referral Type:   Physical Therapy     Referral Reason:   Specialty Services Required     Requested Specialty:   Physical Therapy     Number of Visits Requested:   1     Expiration Date:   2/10/2021     No orders of the defined types were placed in this encounter  History of Present Illness    Artem Goldman is a 62 y o  male presents to [place] [address] and Pain associates with complaints of low back pain radiating to the bilateral lower extremities  Today reports pain to be 6/10 and significantly improved since the bilateral L4-L5 TFESI    He reports overall a 75% improvement in pain  He states the pain is worse in the morning that is intermittent and described as throbbing  Presents today for follow-up and re-evaluation    I have personally reviewed and/or updated the patient's past medical history, past surgical history, family history, social history, current medications, allergies, and vital signs today  Review of Systems   Musculoskeletal: Positive for gait problem and myalgias  All other systems reviewed and are negative  Patient Active Problem List   Diagnosis    Health care maintenance    Screening for prostate cancer    Screening for colon cancer    Obesity (BMI 30-39  9)    Lumbar radiculitis    Leg pain    Muscle spasm of back    Osteoarthritis of lumbar spine    Dyslipidemia    Lumbar spinal stenosis    Lumbar disc disease with radiculopathy       History reviewed  No pertinent past medical history  History reviewed  No pertinent surgical history  Family History   Problem Relation Age of Onset    Dementia Mother     Dementia Father        Social History     Occupational History    Not on file   Tobacco Use    Smoking status: Former Smoker    Smokeless tobacco: Former User   Substance and Sexual Activity    Alcohol use: Not Currently    Drug use: Never    Sexual activity: Yes       Current Outpatient Medications on File Prior to Visit   Medication Sig    cyclobenzaprine (FLEXERIL) 5 mg tablet TAKE 1 TABLET BY MOUTH AT BEDTIME FOR MUSCLE SPASM    meloxicam (MOBIC) 15 mg tablet TAKE 1 TABLET BY MOUTH ONCE DAILY WITH FOOD     No current facility-administered medications on file prior to visit  No Known Allergies    Physical Exam    Ht 5' 3" (1 6 m)   Wt 90 7 kg (200 lb)   BMI 35 43 kg/m²     Constitutional: normal, well developed, well nourished, alert, in no distress and non-toxic and no overt pain behavior    Eyes: anicteric  HEENT: grossly intact  Neck: supple, symmetric, trachea midline and no masses   Pulmonary:even and unlabored  Cardiovascular:No edema or pitting edema present  Skin:Normal without rashes or lesions and well hydrated  Psychiatric:Mood and affect appropriate  Neurologic:Cranial Nerves II-XII grossly intact  Musculoskeletal:normal no significant tenderness to palpation bilateral lumbar paraspinals, active and passive range of motion within normal limits, MMT remains unchanged from previous evaluation    Imaging

## 2020-04-27 ENCOUNTER — TELEPHONE (OUTPATIENT)
Dept: FAMILY MEDICINE CLINIC | Facility: CLINIC | Age: 60
End: 2020-04-27

## 2020-04-27 ENCOUNTER — TELEMEDICINE (OUTPATIENT)
Dept: FAMILY MEDICINE CLINIC | Facility: CLINIC | Age: 60
End: 2020-04-27
Payer: COMMERCIAL

## 2020-04-27 VITALS
BODY MASS INDEX: 35.44 KG/M2 | SYSTOLIC BLOOD PRESSURE: 139 MMHG | TEMPERATURE: 98.4 F | DIASTOLIC BLOOD PRESSURE: 82 MMHG | WEIGHT: 200 LBS | HEIGHT: 63 IN | HEART RATE: 82 BPM

## 2020-04-27 DIAGNOSIS — U07.1 COVID-19 VIRUS INFECTION: Primary | ICD-10-CM

## 2020-04-27 PROCEDURE — 99213 OFFICE O/P EST LOW 20 MIN: CPT | Performed by: FAMILY MEDICINE

## 2020-04-27 PROCEDURE — 3008F BODY MASS INDEX DOCD: CPT | Performed by: NURSE PRACTITIONER

## 2020-04-27 RX ORDER — ASCORBIC ACID 500 MG
500 TABLET ORAL DAILY
COMMUNITY

## 2020-04-27 RX ORDER — ERGOCALCIFEROL (VITAMIN D2) 50 MCG
CAPSULE ORAL
COMMUNITY

## 2020-04-28 ENCOUNTER — TELEMEDICINE (OUTPATIENT)
Dept: FAMILY MEDICINE CLINIC | Facility: CLINIC | Age: 60
End: 2020-04-28
Payer: COMMERCIAL

## 2020-04-28 DIAGNOSIS — U07.1 COVID-19 VIRUS INFECTION: Primary | ICD-10-CM

## 2020-04-28 PROCEDURE — 99213 OFFICE O/P EST LOW 20 MIN: CPT | Performed by: NURSE PRACTITIONER

## 2020-04-29 ENCOUNTER — TELEMEDICINE (OUTPATIENT)
Dept: FAMILY MEDICINE CLINIC | Facility: CLINIC | Age: 60
End: 2020-04-29
Payer: COMMERCIAL

## 2020-04-29 VITALS — TEMPERATURE: 98.3 F

## 2020-04-29 DIAGNOSIS — U07.1 COVID-19 VIRUS INFECTION: Primary | ICD-10-CM

## 2020-04-29 PROCEDURE — 99213 OFFICE O/P EST LOW 20 MIN: CPT | Performed by: NURSE PRACTITIONER

## 2020-04-30 ENCOUNTER — TELEMEDICINE (OUTPATIENT)
Dept: FAMILY MEDICINE CLINIC | Facility: CLINIC | Age: 60
End: 2020-04-30
Payer: COMMERCIAL

## 2020-04-30 VITALS — TEMPERATURE: 98.3 F

## 2020-04-30 DIAGNOSIS — U07.1 COVID-19 VIRUS INFECTION: Primary | ICD-10-CM

## 2020-04-30 PROCEDURE — 99213 OFFICE O/P EST LOW 20 MIN: CPT | Performed by: NURSE PRACTITIONER

## 2020-05-01 ENCOUNTER — TELEPHONE (OUTPATIENT)
Dept: FAMILY MEDICINE CLINIC | Facility: CLINIC | Age: 60
End: 2020-05-01

## 2020-05-04 ENCOUNTER — TELEMEDICINE (OUTPATIENT)
Dept: FAMILY MEDICINE CLINIC | Facility: CLINIC | Age: 60
End: 2020-05-04
Payer: COMMERCIAL

## 2020-05-04 VITALS — TEMPERATURE: 97.4 F

## 2020-05-04 DIAGNOSIS — U07.1 COVID-19 VIRUS INFECTION: Primary | ICD-10-CM

## 2020-05-04 PROCEDURE — 99213 OFFICE O/P EST LOW 20 MIN: CPT | Performed by: NURSE PRACTITIONER

## 2020-05-15 ENCOUNTER — PATIENT OUTREACH (OUTPATIENT)
Dept: FAMILY MEDICINE CLINIC | Facility: CLINIC | Age: 60
End: 2020-05-15

## 2020-05-29 ENCOUNTER — TELEPHONE (OUTPATIENT)
Dept: FAMILY MEDICINE CLINIC | Facility: CLINIC | Age: 60
End: 2020-05-29

## 2020-06-01 ENCOUNTER — TELEPHONE (OUTPATIENT)
Dept: FAMILY MEDICINE CLINIC | Facility: CLINIC | Age: 60
End: 2020-06-01

## 2020-11-03 ENCOUNTER — VBI (OUTPATIENT)
Dept: ADMINISTRATIVE | Facility: OTHER | Age: 60
End: 2020-11-03

## 2021-04-12 ENCOUNTER — IMMUNIZATIONS (OUTPATIENT)
Dept: FAMILY MEDICINE CLINIC | Facility: HOSPITAL | Age: 61
End: 2021-04-12

## 2021-04-12 DIAGNOSIS — Z23 ENCOUNTER FOR IMMUNIZATION: Primary | ICD-10-CM

## 2021-04-12 PROCEDURE — 91301 SARS-COV-2 / COVID-19 MRNA VACCINE (MODERNA) 100 MCG: CPT

## 2021-04-12 PROCEDURE — 0011A SARS-COV-2 / COVID-19 MRNA VACCINE (MODERNA) 100 MCG: CPT

## 2021-05-13 ENCOUNTER — IMMUNIZATIONS (OUTPATIENT)
Dept: FAMILY MEDICINE CLINIC | Facility: HOSPITAL | Age: 61
End: 2021-05-13

## 2021-05-13 DIAGNOSIS — Z23 ENCOUNTER FOR IMMUNIZATION: Primary | ICD-10-CM

## 2021-05-13 PROCEDURE — 91301 SARS-COV-2 / COVID-19 MRNA VACCINE (MODERNA) 100 MCG: CPT

## 2021-05-13 PROCEDURE — 0012A SARS-COV-2 / COVID-19 MRNA VACCINE (MODERNA) 100 MCG: CPT

## 2021-06-30 ENCOUNTER — CLINICAL SUPPORT (OUTPATIENT)
Dept: FAMILY MEDICINE CLINIC | Facility: CLINIC | Age: 61
End: 2021-06-30

## 2021-06-30 DIAGNOSIS — Z11.52 ENCOUNTER FOR SCREENING FOR COVID-19: Primary | ICD-10-CM

## 2021-06-30 PROCEDURE — U0003 INFECTIOUS AGENT DETECTION BY NUCLEIC ACID (DNA OR RNA); SEVERE ACUTE RESPIRATORY SYNDROME CORONAVIRUS 2 (SARS-COV-2) (CORONAVIRUS DISEASE [COVID-19]), AMPLIFIED PROBE TECHNIQUE, MAKING USE OF HIGH THROUGHPUT TECHNOLOGIES AS DESCRIBED BY CMS-2020-01-R: HCPCS | Performed by: FAMILY MEDICINE

## 2021-06-30 PROCEDURE — U0005 INFEC AGEN DETEC AMPLI PROBE: HCPCS | Performed by: FAMILY MEDICINE

## 2021-07-01 ENCOUNTER — TELEPHONE (OUTPATIENT)
Dept: FAMILY MEDICINE CLINIC | Facility: CLINIC | Age: 61
End: 2021-07-01

## 2021-07-01 LAB — SARS-COV-2 RNA RESP QL NAA+PROBE: NEGATIVE

## 2021-08-03 ENCOUNTER — VBI (OUTPATIENT)
Dept: ADMINISTRATIVE | Facility: OTHER | Age: 61
End: 2021-08-03

## 2021-09-02 NOTE — PATIENT INSTRUCTIONS
Continue physical therapy continues same medications    Patient to return in 6 weeks for re-evaluation What Type Of Note Output Would You Prefer (Optional)?: Standard Output How Severe Is Your Rash?: mild Is This A New Presentation, Or A Follow-Up?: Rash